# Patient Record
Sex: FEMALE | URBAN - METROPOLITAN AREA
[De-identification: names, ages, dates, MRNs, and addresses within clinical notes are randomized per-mention and may not be internally consistent; named-entity substitution may affect disease eponyms.]

---

## 2020-06-24 ENCOUNTER — APPOINTMENT (RX ONLY)
Dept: URBAN - METROPOLITAN AREA CLINIC 124 | Facility: CLINIC | Age: 25
Setting detail: DERMATOLOGY
End: 2020-06-24

## 2020-07-22 ENCOUNTER — APPOINTMENT (RX ONLY)
Dept: URBAN - METROPOLITAN AREA CLINIC 124 | Facility: CLINIC | Age: 25
Setting detail: DERMATOLOGY
End: 2020-07-22

## 2020-08-19 ENCOUNTER — APPOINTMENT (RX ONLY)
Dept: URBAN - METROPOLITAN AREA CLINIC 124 | Facility: CLINIC | Age: 25
Setting detail: DERMATOLOGY
End: 2020-08-19

## 2020-09-16 ENCOUNTER — APPOINTMENT (RX ONLY)
Dept: URBAN - METROPOLITAN AREA CLINIC 124 | Facility: CLINIC | Age: 25
Setting detail: DERMATOLOGY
End: 2020-09-16

## 2020-10-14 ENCOUNTER — APPOINTMENT (RX ONLY)
Dept: URBAN - METROPOLITAN AREA CLINIC 124 | Facility: CLINIC | Age: 25
Setting detail: DERMATOLOGY
End: 2020-10-14

## 2020-10-14 DIAGNOSIS — Z41.9 ENCOUNTER FOR PROCEDURE FOR PURPOSES OTHER THAN REMEDYING HEALTH STATE, UNSPECIFIED: ICD-10-CM

## 2020-10-14 PROCEDURE — ? LASER HAIR REMOVAL

## 2020-10-14 ASSESSMENT — LOCATION ZONE DERM: LOCATION ZONE: LIP

## 2020-10-14 ASSESSMENT — LOCATION DETAILED DESCRIPTION DERM
LOCATION DETAILED: LEFT LOWER CUTANEOUS LIP
LOCATION DETAILED: RIGHT UPPER CUTANEOUS LIP

## 2020-10-14 ASSESSMENT — LOCATION SIMPLE DESCRIPTION DERM
LOCATION SIMPLE: LEFT LIP
LOCATION SIMPLE: RIGHT LIP

## 2020-10-14 NOTE — PROCEDURE: LASER HAIR REMOVAL
Cooling: DCD setting
Tolerated Procedure (Optional): Tolerated Well
Device Serial Number (Optional): [id number]
Treatment Number: 0
Post-Care Instructions: I reviewed with the patient in detail post-care instructions. Patient should avoid sun for a minimum of 4 weeks before and after treatment.
Price (Use Numbers Only, No Special Characters Or $): 75
Render Post-Care In The Note: No
Fluence (Will Not Render If 0): 20
Pulse Duration: auto
Laser Type: diode 810nm
Detail Level: Zone
Fluence (Will Not Render If 0): 28
Shaving (Optional): The patient shaved at home
Cooling: chill tip
Pre-Procedure: Prior to proceeding the treatment areas were cleaned and all present put on their eye protection.
Fluence (Will Not Render If 0): 20
Consent: Written consent obtained, risks reviewed including but not limited to crusting, scabbing, blistering, scarring, darker or lighter pigmentary change, paradoxical hair regrowth, incomplete removal of hair and infection.
Fluence (Will Not Render If 0): 33
Eye Shield Text: Given the treatment area eye shields were inserted prior to treatment.
Topical Anesthesia Type: ice
Post-Procedure Care: Immediate endpoint: perifollicular erythema and edema. Ice applied. Post care reviewed with patient.

## 2021-02-23 ENCOUNTER — APPOINTMENT (RX ONLY)
Dept: URBAN - METROPOLITAN AREA CLINIC 124 | Facility: CLINIC | Age: 26
Setting detail: DERMATOLOGY
End: 2021-02-23

## 2021-07-09 ENCOUNTER — APPOINTMENT (RX ONLY)
Dept: URBAN - METROPOLITAN AREA CLINIC 124 | Facility: CLINIC | Age: 26
Setting detail: DERMATOLOGY
End: 2021-07-09

## 2022-08-03 ENCOUNTER — APPOINTMENT (RX ONLY)
Dept: URBAN - METROPOLITAN AREA CLINIC 69 | Facility: CLINIC | Age: 27
Setting detail: DERMATOLOGY
End: 2022-08-03

## 2022-08-03 DIAGNOSIS — Z41.9 ENCOUNTER FOR PROCEDURE FOR PURPOSES OTHER THAN REMEDYING HEALTH STATE, UNSPECIFIED: ICD-10-CM

## 2022-08-03 PROCEDURE — ? LASER HAIR REMOVAL

## 2022-08-03 NOTE — PROCEDURE: LASER HAIR REMOVAL
Total Area (Optional- Include Units): chin
Detail Level: Simple
Total Pulses: custom
Consent: Written consent obtained, risks reviewed including but not limited to crusting, scabbing, blistering, scarring, darker or lighter pigmentary change, paradoxical hair regrowth, incomplete removal of hair and infection.
Cooling Override: cold tip
Fluence (Will Not Render If 0): 26
External Cooling: SmartCool
Number Of Prepaid Treatments (Will Not Render If 0): 0
Total Pulses: medium
Post-Care Instructions: I reviewed with the patient in detail post-care instructions. Patient should avoid sun for a minimum of 4 weeks before and after treatment.
Pre-Procedure: Prior to proceeding the treatment areas were cleaned and all present put on their eye protection.
Eye Shield Text: Given the treatment area eye shields were inserted prior to treatment.
Cooling: chill tip
Anesthesia Type: 1% lidocaine with epinephrine
Fluence (Will Not Render If 0): 30
Total Pulses: 138
External Cooling Fan Speed: 2
Post-Procedure Care: Immediate endpoint: perifollicular erythema and edema. Vaseline and ice applied. Post care reviewed with patient.
Fluence (Will Not Render If 0): 20
Were Eye Shields Employed?: No
Tolerated Procedure (Optional): Tolerated Well
Fluence (Will Not Render If 0): 6
Laser Type: Desire Diode 805nm
Pulse Duration (Include Units): auto

## 2022-09-22 ENCOUNTER — APPOINTMENT (RX ONLY)
Dept: URBAN - METROPOLITAN AREA CLINIC 124 | Facility: CLINIC | Age: 27
Setting detail: DERMATOLOGY
End: 2022-09-22

## 2022-09-22 DIAGNOSIS — Z41.9 ENCOUNTER FOR PROCEDURE FOR PURPOSES OTHER THAN REMEDYING HEALTH STATE, UNSPECIFIED: ICD-10-CM

## 2022-09-22 PROCEDURE — ? LASER HAIR REMOVAL

## 2022-09-22 NOTE — HPI: COSMETIC (LASER HAIR REMOVAL)
Have You Had Laser Hair Removal Before?: has had previous treatment
When Was Your Last Laser Treatment?: 08/03/2022
Number Of Treatments: 10

## 2022-09-22 NOTE — PROCEDURE: LASER HAIR REMOVAL
Consent: Written consent obtained, risks reviewed including but not limited to crusting, scabbing, blistering, scarring, darker or lighter pigmentary change, paradoxical hair regrowth, incomplete removal of hair and infection.
Eye Shield Text: Given the treatment area eye shields were inserted prior to treatment.
Price (Use Numbers Only, No Special Characters Or $): 75
Total Pulses: 99
Post-Care Instructions: I reviewed with the patient in detail post-care instructions. Patient should avoid sun for a minimum of 4 weeks before and after treatment.
Pre-Procedure: Prior to proceeding the treatment areas were cleaned and all present put on their eye protection.
Total Pulses: 1
Treatment Number: 0
Treatment Number: 10
Cooling: DCD 30/30
Spot Size: 8 mm
Post-Procedure Care: Immediate endpoint: perifollicular erythema and edema. Ice pack given. Post care reviewed with patient.
Render Post-Care In The Note: Yes
Detail Level: Simple
Were Eye Shields Employed?: No
Fluence (Will Not Render If 0): 20
Fluence (Will Not Render If 0): 8
Tolerated Procedure (Optional): 5 out of 10
Fluence (Will Not Render If 0): 30
Fluence (Will Not Render If 0): 30
Pulse Duration (Include Units): 100
Pulse Duration (Include Units): 30
Laser Type: Desire Diode 805nm
Shaving (Optional): The patient shaved at home

## 2022-10-27 ENCOUNTER — APPOINTMENT (RX ONLY)
Dept: URBAN - METROPOLITAN AREA CLINIC 124 | Facility: CLINIC | Age: 27
Setting detail: DERMATOLOGY
End: 2022-10-27

## 2022-10-27 DIAGNOSIS — Z41.9 ENCOUNTER FOR PROCEDURE FOR PURPOSES OTHER THAN REMEDYING HEALTH STATE, UNSPECIFIED: ICD-10-CM

## 2022-10-27 PROCEDURE — ? LASER HAIR REMOVAL

## 2022-10-27 ASSESSMENT — LOCATION SIMPLE DESCRIPTION DERM
LOCATION SIMPLE: LEFT LIP
LOCATION SIMPLE: RIGHT CHEEK
LOCATION SIMPLE: LEFT CHEEK

## 2022-10-27 ASSESSMENT — LOCATION ZONE DERM
LOCATION ZONE: FACE
LOCATION ZONE: LIP

## 2022-10-27 ASSESSMENT — LOCATION DETAILED DESCRIPTION DERM
LOCATION DETAILED: LEFT PHILTRAL RIDGE
LOCATION DETAILED: RIGHT MEDIAL BUCCAL CHEEK
LOCATION DETAILED: LEFT INFERIOR CENTRAL MALAR CHEEK

## 2022-10-27 NOTE — PROCEDURE: LASER HAIR REMOVAL
Number Of Prepaid Treatments (Will Not Render If 0): 0
Tolerated Procedure (Optional): 2 out of 10
Pulse Duration (Include Units): custom
Cooling: DCD 30/30
Number Of Prepaid Treatments (Will Not Render If 0): 6
Fluence (Will Not Render If 0): 9
Render Post-Care In The Note: Yes
Post-Procedure Care: Immediate endpoint: perifollicular erythema and edema. Ice pack given. Post care reviewed with patient.
Shaving (Optional): The patient shaved at home
Detail Level: Zone
Pulse Duration (Include Units): 30
Spot Size: 3 mm
Laser Type: Desire Diode 805nm
Eye Shield Text: Given the treatment area eye shields were inserted prior to treatment.
Spot Size: 8 mm
Pulse Duration (Include Units): custom/med
Consent: Written consent obtained, risks reviewed including but not limited to crusting, scabbing, blistering, scarring, darker or lighter pigmentary change, paradoxical hair regrowth, incomplete removal of hair and infection.
Price (Use Numbers Only, No Special Characters Or $): 75
Post-Care Instructions: I reviewed with the patient in detail post-care instructions. Patient should avoid sun for a minimum of 4 weeks before and after treatment.
Pre-Procedure: Prior to proceeding the treatment areas were cleaned and all present put on their eye protection.
Fluence (Will Not Render If 0): 30
Were Eye Shields Employed?: No
Treatment Number: 11
Cooling: DCD setting

## 2022-10-27 NOTE — HPI: COSMETIC (LASER HAIR REMOVAL)
Have You Had Laser Hair Removal Before?: has had previous treatment
When Was Your Last Laser Treatment?: 09/22/2022
Number Of Treatments: 11

## 2022-11-23 ENCOUNTER — APPOINTMENT (RX ONLY)
Dept: URBAN - METROPOLITAN AREA CLINIC 124 | Facility: CLINIC | Age: 27
Setting detail: DERMATOLOGY
End: 2022-11-23

## 2022-11-23 DIAGNOSIS — Z41.9 ENCOUNTER FOR PROCEDURE FOR PURPOSES OTHER THAN REMEDYING HEALTH STATE, UNSPECIFIED: ICD-10-CM

## 2022-11-23 PROCEDURE — ? LASER HAIR REMOVAL

## 2022-11-23 ASSESSMENT — LOCATION SIMPLE DESCRIPTION DERM: LOCATION SIMPLE: UPPER LIP

## 2022-11-23 ASSESSMENT — LOCATION ZONE DERM: LOCATION ZONE: LIP

## 2022-11-23 ASSESSMENT — LOCATION DETAILED DESCRIPTION DERM: LOCATION DETAILED: PHILTRUM

## 2022-11-23 NOTE — HPI: COSMETIC (LASER HAIR REMOVAL)
Have You Had Laser Hair Removal Before?: has had previous treatment
When Was Your Last Laser Treatment?: 09/22/22
Number Of Treatments: 11

## 2022-11-23 NOTE — PROCEDURE: LASER HAIR REMOVAL
Cooling: DCD 30/30
Price (Use Numbers Only, No Special Characters Or $): 75
Spot Size: 8 mm
Were Eye Shields Employed?: No
Pulse Duration (Include Units): custom/ med
Tolerated Procedure (Optional): Tolerated Well
Fluence (Will Not Render If 0): 6
Render Post-Care In The Note: Yes
Treatment Number: 0
Pulse Duration (Include Units): 30/custom
Fluence (Will Not Render If 0): 9
Pre-Procedure: Prior to proceeding the treatment areas were cleaned and all present put on their eye protection.
Treatment Number: 12
Pulse Duration (Include Units): 30/med/custom
Detail Level: Zone
Consent: Written consent obtained, risks reviewed including but not limited to crusting, scabbing, blistering, scarring, darker or lighter pigmentary change, paradoxical hair regrowth, incomplete removal of hair and infection.
Eye Shield Text: Given the treatment area eye shields were inserted prior to treatment.
Post-Procedure Care: Immediate endpoint: perifollicular erythema and edema. Ice pack given. Post care reviewed with patient.
Shaving (Optional): The patient shaved at home
Post-Care Instructions: I reviewed with the patient in detail post-care instructions. Patient should avoid sun for a minimum of 4 weeks before and after treatment.
Total Pulses: No higher than 9.0
Laser Type: Desire Diode 805nm
Cooling: DCD setting

## 2022-12-28 ENCOUNTER — APPOINTMENT (RX ONLY)
Dept: URBAN - METROPOLITAN AREA CLINIC 124 | Facility: CLINIC | Age: 27
Setting detail: DERMATOLOGY
End: 2022-12-28

## 2022-12-28 DIAGNOSIS — Z41.9 ENCOUNTER FOR PROCEDURE FOR PURPOSES OTHER THAN REMEDYING HEALTH STATE, UNSPECIFIED: ICD-10-CM

## 2022-12-28 PROCEDURE — ? LASER HAIR REMOVAL

## 2022-12-28 ASSESSMENT — LOCATION ZONE DERM: LOCATION ZONE: FACE

## 2022-12-28 ASSESSMENT — LOCATION SIMPLE DESCRIPTION DERM
LOCATION SIMPLE: CHIN
LOCATION SIMPLE: LEFT CHEEK
LOCATION SIMPLE: RIGHT CHEEK

## 2022-12-28 ASSESSMENT — LOCATION DETAILED DESCRIPTION DERM
LOCATION DETAILED: RIGHT SUPERIOR MEDIAL BUCCAL CHEEK
LOCATION DETAILED: LEFT INFERIOR CENTRAL BUCCAL CHEEK
LOCATION DETAILED: LEFT SUPERIOR MEDIAL BUCCAL CHEEK
LOCATION DETAILED: RIGHT CHIN

## 2022-12-28 NOTE — PROCEDURE: LASER HAIR REMOVAL
Fluence (Will Not Render If 0): 30
Pulse Duration (Include Units): Auto/med Skin type: III Hair color: Dark brown Hair Texture: Fine
Treatment Number: 0
Spot Size: 8 mm
Consent: Written consent obtained, risks reviewed including but not limited to crusting, scabbing, blistering, scarring, darker or lighter pigmentary change, paradoxical hair regrowth, incomplete removal of hair and infection.
Cooling: DCD 30/30
Pulse Duration (Include Units): 30/ med
Fluence (Will Not Render If 0): 6
Tolerated Procedure (Optional): Tolerated Well
Were Eye Shields Employed?: No
Post-Procedure Care: Immediate endpoint: even erythema to all treatment areas.
Post-Care Instructions: I reviewed with the patient in detail post-care instructions. Patient should avoid sun for a minimum of 4 weeks before and after treatment.
Cooling: DCD setting
Detail Level: Zone
Laser Type: Desire Diode 805nm
Fluence (Will Not Render If 0): 28
Shaving (Optional): The patient shaved at home
Render Post-Care In The Note: Yes
Eye Shield Text: Given the treatment area eye shields were inserted prior to treatment.
Pulse Duration (Include Units): 6.5 custom/ med
Fluence (Will Not Render If 0): 30
Price (Use Numbers Only, No Special Characters Or $): 75
Pre-Procedure: Prior to proceeding the treatment areas were cleaned and all present put on their eye protection.
Pulse Duration (Include Units): med/100

## 2022-12-28 NOTE — HPI: COSMETIC (LASER HAIR REMOVAL)
Have You Had Laser Hair Removal Before?: has had previous treatment
When Was Your Last Laser Treatment?: 09/22/22
Number Of Treatments: Touch up